# Patient Record
Sex: MALE | Race: WHITE | NOT HISPANIC OR LATINO | Employment: FULL TIME | URBAN - METROPOLITAN AREA
[De-identification: names, ages, dates, MRNs, and addresses within clinical notes are randomized per-mention and may not be internally consistent; named-entity substitution may affect disease eponyms.]

---

## 2020-06-22 ENCOUNTER — OFFICE VISIT (OUTPATIENT)
Dept: INTERNAL MEDICINE | Facility: CLINIC | Age: 40
End: 2020-06-22
Payer: COMMERCIAL

## 2020-06-22 ENCOUNTER — TELEPHONE (OUTPATIENT)
Dept: INTERNAL MEDICINE | Facility: CLINIC | Age: 40
End: 2020-06-22

## 2020-06-22 VITALS
OXYGEN SATURATION: 98 % | DIASTOLIC BLOOD PRESSURE: 78 MMHG | WEIGHT: 202 LBS | HEIGHT: 72 IN | TEMPERATURE: 98.3 F | RESPIRATION RATE: 12 BRPM | SYSTOLIC BLOOD PRESSURE: 120 MMHG | BODY MASS INDEX: 27.36 KG/M2 | HEART RATE: 68 BPM

## 2020-06-22 DIAGNOSIS — K42.9 UMBILICAL HERNIA WITHOUT OBSTRUCTION AND WITHOUT GANGRENE: Primary | ICD-10-CM

## 2020-06-22 DIAGNOSIS — Z02.6 ENCOUNTER RELATED TO WORKER'S COMPENSATION CLAIM: ICD-10-CM

## 2020-06-22 PROCEDURE — 99213 OFFICE O/P EST LOW 20 MIN: CPT | Performed by: INTERNAL MEDICINE

## 2020-06-22 ASSESSMENT — ENCOUNTER SYMPTOMS
COUGH: 0
SHORTNESS OF BREATH: 0
DIFFICULTY URINATING: 0
FEVER: 0
CONSTIPATION: 0
DIARRHEA: 0
CHILLS: 0
SLEEP DISTURBANCE: 0
DIZZINESS: 0
HEMATOCHEZIA: 0
HEADACHES: 0
ABDOMINAL PAIN: 1

## 2020-06-22 ASSESSMENT — MIFFLIN-ST. JEOR: SCORE: 1874.27

## 2020-06-22 NOTE — PROGRESS NOTES
Subjective     Jesus Parr is a 40 year old male who presents to clinic today for the following health issues:    HPI   Patient works as a  for more than 3 years.  1 to 2 weeks ago noticed fullness in the umbilicus region and thinks he has umbilical hernia.  This visit goes through workman compensation claim.  Has some discomfort off-and-on.  Denies any change in the bowel habit.  Denies nausea, vomiting.  Denies fever, chills.  Denies any abdominal surgery.      There is no problem list on file for this patient.    Past Surgical History:   Procedure Laterality Date     BIOPSY OF SKIN LESION      benign     KNEE SURGERY  2008    2 on left knee, 1 on right knee-all meniscal       Social History     Tobacco Use     Smoking status: Never Smoker     Smokeless tobacco: Never Used   Substance Use Topics     Alcohol use: Yes     Family History   Problem Relation Age of Onset     Heart Disease Father         heart valve      Parkinsonism Maternal Grandfather      Alzheimer Disease Paternal Grandfather          Current Outpatient Medications   Medication Sig Dispense Refill     Glucosamine-Chondroitin (GLUCOSAMINE CHONDR COMPLEX PO)        No Known Allergies    Reviewed and updated as needed this visit by Provider  Meds  Problems         Review of Systems   Constitutional: Negative for chills and fever.   HENT: Negative for congestion.    Eyes: Negative for visual disturbance.   Respiratory: Negative for cough and shortness of breath.    Cardiovascular: Negative for chest pain.   Gastrointestinal: Positive for abdominal pain. Negative for constipation, diarrhea and hematochezia.   Endocrine: Negative for cold intolerance.   Genitourinary: Negative for difficulty urinating.   Neurological: Negative for dizziness and headaches.   Psychiatric/Behavioral: Negative for sleep disturbance.          Objective    /78   Pulse 68   Temp 98.3  F (36.8  C) (Oral)   Resp 12   Ht 1.829 m (6')   Wt 91.6 kg (202  lb)   SpO2 98%   BMI 27.40 kg/m    Body mass index is 27.4 kg/m .  Physical Exam  Vitals signs reviewed.   Constitutional:       Appearance: Normal appearance.   HENT:      Head: Normocephalic and atraumatic.   Neck:      Musculoskeletal: Normal range of motion.   Cardiovascular:      Rate and Rhythm: Normal rate and regular rhythm.   Pulmonary:      Effort: Pulmonary effort is normal.      Breath sounds: Normal breath sounds.   Abdominal:      Comments: Mild fullness noted in the umbilical region.   Neurological:      General: No focal deficit present.      Mental Status: He is alert.   Psychiatric:         Mood and Affect: Mood normal.        Assessment & Plan     Jesus was seen today for hernia.    Diagnoses and all orders for this visit:    Umbilical hernia without obstruction and without gangrene  -     US Abdomen Complete; Future    Encounter related to worker's compensation claim    Once ultrasound is done we will decide about surgery referral.  For now patient will continue to work as it is not causing significant discomfort.    Janice Nielsen MD  [facility]

## 2020-06-22 NOTE — NURSING NOTE
/78   Pulse 68   Temp 98.3  F (36.8  C) (Oral)   Resp 12   Ht 1.829 m (6')   Wt 91.6 kg (202 lb)   SpO2 98%   BMI 27.40 kg/m

## 2020-06-22 NOTE — TELEPHONE ENCOUNTER
"Call to patient per Dr. Nielsen's request. Patient has appointment scheduled today for \"hernia on belly\". Dr. Nielsen asking if patient's insurance requires a referral. If not patient could self refer to surgery.    Left message for patient to call back.    "

## 2020-06-25 ENCOUNTER — HOSPITAL ENCOUNTER (OUTPATIENT)
Dept: ULTRASOUND IMAGING | Facility: CLINIC | Age: 40
Discharge: HOME | End: 2020-06-25
Attending: INTERNAL MEDICINE | Admitting: INTERNAL MEDICINE
Payer: OTHER MISCELLANEOUS

## 2020-06-25 DIAGNOSIS — K42.9 UMBILICAL HERNIA WITHOUT OBSTRUCTION AND WITHOUT GANGRENE: ICD-10-CM

## 2020-06-25 PROCEDURE — 76700 US EXAM ABDOM COMPLETE: CPT

## 2020-07-01 ENCOUNTER — TELEPHONE (OUTPATIENT)
Dept: INTERNAL MEDICINE | Facility: CLINIC | Age: 40
End: 2020-07-01

## 2020-07-01 NOTE — TELEPHONE ENCOUNTER
Patient calling  Negative ultrasound and patient is feeling better. Not sure if he should just monitor symptoms or what is the next step  Please advise  Ok to call and rony [phone]

## 2020-08-18 NOTE — TELEPHONE ENCOUNTER
Patient calling to find out what the diagnosis is for his visit on 6/22/20 since US was negative. What could have caused the pain he was having. His worker's comp claim was denied. Call him back to advise at [phone] (ok to leave detailed message)

## 2020-08-19 NOTE — TELEPHONE ENCOUNTER
"Per Dr Nielsen's message below, \"it could be muscle strain\".     Call to pt and left detailed message to call back to provide further information if still having symptoms.     Would need to send back to provider, since it has been 2 months now.       "

## 2023-11-29 ENCOUNTER — APPOINTMENT (OUTPATIENT)
Dept: GENERAL RADIOLOGY | Facility: CLINIC | Age: 43
End: 2023-11-29
Attending: STUDENT IN AN ORGANIZED HEALTH CARE EDUCATION/TRAINING PROGRAM
Payer: OTHER MISCELLANEOUS

## 2023-11-29 ENCOUNTER — HOSPITAL ENCOUNTER (EMERGENCY)
Facility: CLINIC | Age: 43
Discharge: HOME | End: 2023-11-29
Attending: STUDENT IN AN ORGANIZED HEALTH CARE EDUCATION/TRAINING PROGRAM | Admitting: STUDENT IN AN ORGANIZED HEALTH CARE EDUCATION/TRAINING PROGRAM
Payer: OTHER MISCELLANEOUS

## 2023-11-29 VITALS
SYSTOLIC BLOOD PRESSURE: 125 MMHG | HEIGHT: 72 IN | RESPIRATION RATE: 18 BRPM | DIASTOLIC BLOOD PRESSURE: 83 MMHG | TEMPERATURE: 97.8 F | BODY MASS INDEX: 25.06 KG/M2 | WEIGHT: 185 LBS | OXYGEN SATURATION: 98 % | HEART RATE: 64 BPM

## 2023-11-29 DIAGNOSIS — S68.119A TRAUMATIC AMPUTATION OF FINGER, INITIAL ENCOUNTER: ICD-10-CM

## 2023-11-29 DIAGNOSIS — S62.631B OPEN DISPLACED FRACTURE OF DISTAL PHALANX OF LEFT INDEX FINGER, INITIAL ENCOUNTER: ICD-10-CM

## 2023-11-29 LAB
HOLD SPECIMEN: NORMAL
HOLD SPECIMEN: NORMAL

## 2023-11-29 PROCEDURE — 90715 TDAP VACCINE 7 YRS/> IM: CPT | Performed by: STUDENT IN AN ORGANIZED HEALTH CARE EDUCATION/TRAINING PROGRAM

## 2023-11-29 PROCEDURE — 64450 NJX AA&/STRD OTHER PN/BRANCH: CPT

## 2023-11-29 PROCEDURE — 250N000011 HC RX IP 250 OP 636

## 2023-11-29 PROCEDURE — 250N000011 HC RX IP 250 OP 636: Performed by: STUDENT IN AN ORGANIZED HEALTH CARE EDUCATION/TRAINING PROGRAM

## 2023-11-29 PROCEDURE — 73140 X-RAY EXAM OF FINGER(S): CPT | Mod: LT

## 2023-11-29 PROCEDURE — 90471 IMMUNIZATION ADMIN: CPT | Performed by: STUDENT IN AN ORGANIZED HEALTH CARE EDUCATION/TRAINING PROGRAM

## 2023-11-29 PROCEDURE — 99284 EMERGENCY DEPT VISIT MOD MDM: CPT | Mod: 25

## 2023-11-29 RX ORDER — CEPHALEXIN 500 MG/1
500 CAPSULE ORAL 4 TIMES DAILY
Qty: 20 CAPSULE | Refills: 0 | Status: SHIPPED | OUTPATIENT
Start: 2023-11-29 | End: 2023-12-04

## 2023-11-29 RX ORDER — CEFAZOLIN SODIUM 1 G/3ML
1 INJECTION, POWDER, FOR SOLUTION INTRAMUSCULAR; INTRAVENOUS ONCE
Status: COMPLETED | OUTPATIENT
Start: 2023-11-29 | End: 2023-11-29

## 2023-11-29 RX ORDER — BUPIVACAINE HYDROCHLORIDE 5 MG/ML
INJECTION, SOLUTION EPIDURAL; INTRACAUDAL
Status: COMPLETED
Start: 2023-11-29 | End: 2023-11-29

## 2023-11-29 RX ORDER — IBUPROFEN 200 MG
400 TABLET ORAL EVERY 8 HOURS PRN
Qty: 30 TABLET | Refills: 0 | Status: SHIPPED | OUTPATIENT
Start: 2023-11-29

## 2023-11-29 RX ORDER — OXYCODONE HYDROCHLORIDE 5 MG/1
5 TABLET ORAL EVERY 6 HOURS PRN
Qty: 8 TABLET | Refills: 0 | Status: SHIPPED | OUTPATIENT
Start: 2023-11-29 | End: 2023-12-02

## 2023-11-29 RX ADMIN — BUPIVACAINE HYDROCHLORIDE: 5 INJECTION, SOLUTION EPIDURAL; INTRACAUDAL; PERINEURAL at 12:22

## 2023-11-29 RX ADMIN — CEFAZOLIN 1 G: 1 INJECTION, POWDER, FOR SOLUTION INTRAMUSCULAR; INTRAVENOUS at 11:05

## 2023-11-29 RX ADMIN — CLOSTRIDIUM TETANI TOXOID ANTIGEN (FORMALDEHYDE INACTIVATED), CORYNEBACTERIUM DIPHTHERIAE TOXOID ANTIGEN (FORMALDEHYDE INACTIVATED), BORDETELLA PERTUSSIS TOXOID ANTIGEN (GLUTARALDEHYDE INACTIVATED), BORDETELLA PERTUSSIS FILAMENTOUS HEMAGGLUTININ ANTIGEN (FORMALDEHYDE INACTIVATED), BORDETELLA PERTUSSIS PERTACTIN ANTIGEN, AND BORDETELLA PERTUSSIS FIMBRIAE 2/3 ANTIGEN 0.5 ML: 5; 2; 2.5; 5; 3; 5 INJECTION, SUSPENSION INTRAMUSCULAR at 12:22

## 2023-11-29 ASSESSMENT — ACTIVITIES OF DAILY LIVING (ADL)
ADLS_ACUITY_SCORE: 35
ADLS_ACUITY_SCORE: 35

## 2023-11-29 NOTE — DISCHARGE INSTRUCTIONS
You will be contacted by [facility] orthopedic hand surgery team for Dr. Harris and should be seen at their clinic today at 1300.

## 2023-11-29 NOTE — ED PROVIDER NOTES
History     Chief Complaint:  Injury       HPI   Jesus Parr is a 43 year old male right-hand-dominant, otherwise healthy, was working with a adjustable object that fell onto his left index finger just prior to arrival.  It amputated the distal left index finger past the DIP.  He bandaged it and came in here afterwards.  Uncertain of last tetanus.  Pain 3 out of 10.  Slight numbness over the distal digit.      Independent Historian:    none    Review of External Notes:  none    Allergies:  No Known Allergies     Physical Exam   Patient Vitals for the past 24 hrs:   BP Temp Temp src Pulse Resp SpO2 Height Weight   11/29/23 1220 125/83 -- -- 64 -- 98 % -- --   11/29/23 1015 127/86 -- -- 65 -- 93 % -- --   11/29/23 0832 -- 97.8  F (36.6  C) Oral -- -- -- -- --   11/29/23 0830 -- -- -- -- -- 100 % -- --   11/29/23 0829 120/84 -- -- 64 -- -- -- --   11/29/23 0821 119/69 97.5  F (36.4  C) Temporal 64 18 100 % 1.829 m (6') 83.9 kg (185 lb)        Physical Exam  GENERAL: Patient well-appearing  HEAD: Atraumatic.  NECK: No rigidity  CV: RRR, no murmurs rubs or gallops  PULM: CTAB with good aeration; no retractions, rales, rhonchi, or wheezing  DERM: Hands are dirty.  Skin warm and dry  EXTREMITY: Left index finger amputated just proximal to the nailbed.  Visibly exposed bone and fracture fragment.  Bleeding is controlled.  No obvious foreign body.  Patient has the amputated portion with him and its on ice water in a bag.      Emergency Department Course       Digital Block       Procedure: Digital Block    Indication: Wound care    Consent: Verbal    Preparation: Alcohol    Anesthesia: A digital block was performed with Bupivacaine - 0.5% on the affected digit.     Location: L second (index) finger    Procedure Detail: A digital block was performed on the indicated digit with the above anesthetic.     Patient status: The patient tolerated the procedure well: Yes. There were no complications.      Laboratory: Imaging:    Labs Ordered and Resulted from Time of ED Arrival to Time of ED Departure - No data to display  Fingers XR, 2-3 views, left   Final Result   IMPRESSION: Dilatation of the second digit distally. There is absence   of the first distal phalangeal tuft with a residual fragment at the   base. There are 2 small displaced fracture fragments at the volar   aspect. Surrounding soft tissue swelling. No definite radiopaque   foreign body is identified. There is normal joint spacing and   alignment.      ANNE SANCHEZ MD            SYSTEM ID:  [id number]                   Emergency Department Course & Assessments:             Interventions:  Medications   BUPivacaine (PF) (MARCAINE) 0.5 % injection (  $Given by Other Clinician 11/29/23 1222)   ceFAZolin (ANCEF) 1 g vial to attach to  ml bag for ADULT or 50 ml bag for PEDS (0 g Intravenous Stopped 11/29/23 1227)   Tdap (tetanus-diphtheria-acell pertussis) (ADACEL) injection 0.5 mL (0.5 mLs Intramuscular $Given 11/29/23 1222)        Assessments, Independent Interpretation, Consult/Discussion of ManagementTests:     Orthopedic Hand Surgeon Dr. Harris  Social Determinants of Health affecting care:  None    Disposition:  The patient was discharged to home.     Impression & Plan         Medical Decision Making:  Patient with a traumatic amputation of the left index finger distal to the DIP.  Differential diagnosis considered fracture, dislocation, crush injury, and others.  X-ray demonstrating the fracture fragments.  On my exam there is exposed bone.  I blocked his index finger with bupivacaine with good anesthesia and thoroughly cleaned out the wound and his hands.  Tetanus was updated.  Bleeding was initially cold controlled with a pressure dressing and then taken down and Xeroform was placed and it was loosely wrapped in Coban.  There was no constrictive wrapping.  He was placed in a finger splint.  Given IV Ancef here.  Given prescription for Keflex and oxycodone and  ibuprofen.  Discussed with orthopedic hand surgery Dr. Harris who kindly provided guidance for the management of this and will see patient today at 1300 and her clinic for revision amputation.  Amputated portion cannot be reattached in my discussion with orthopedic hand surgery.  I have evaluated the patient for acute medical emergencies and have clinically decided no further acute medical interventions are required. Reassessed multiple times and improving. Patient stable for discharge. All questions answered. Given strict return precautions. Patient content with plan. The differential diagnosis and treatment modalities were discussed thoroughly with the patient.        Diagnosis:    ICD-10-CM    1. Traumatic amputation of finger, initial encounter  S68.119A       2. Open displaced fracture of distal phalanx of left index finger, initial encounter  S62.631B            Discharge Medications:  Discharge Medication List as of 11/29/2023 12:27 PM        START taking these medications    Details   cephALEXin (KEFLEX) 500 MG capsule Take 1 capsule (500 mg) by mouth 4 times daily for 5 days, Disp-20 capsule, R-0, Local Print      ibuprofen (ADVIL/MOTRIN) 200 MG tablet Take 2 tablets (400 mg) by mouth every 8 hours as needed for pain, Disp-30 tablet, R-0, Local Print      oxyCODONE (ROXICODONE) 5 MG tablet Take 1 tablet (5 mg) by mouth every 6 hours as needed for pain, Disp-8 tablet, R-0, Local Print                11/29/2023   Rod Cintron MD Foss, Kevin, MD  11/29/23 9155

## 2023-11-29 NOTE — ED NOTES
Discharged home, reminded to follow-up with [facility], belongings returned. Signed scripts provided. Pt and family did not have any questions or concerns noted upon departure.

## 2023-11-29 NOTE — ED TRIAGE NOTES
Pt arrives for left pointer finger injury. Was at work when a adjustable kalia fell onto pt's hand. Missing tip of finger along with nail. Reports 3/10 pain. Pt diaphoretic in triage and reporting dizziness.

## 2023-11-29 NOTE — ED NOTES
Emergency Department Technician Wound Irrigation Note:    11/29/2023    11:24 AM      Wound location:  Lt. hand    Irrigation Fluid: Normal Saline    Estimated Irrigation Volume (60 mL fluid per cm): 2000ml    Pt. Tolerated wound procedure cleo Billings